# Patient Record
Sex: MALE | URBAN - NONMETROPOLITAN AREA
[De-identification: names, ages, dates, MRNs, and addresses within clinical notes are randomized per-mention and may not be internally consistent; named-entity substitution may affect disease eponyms.]

---

## 2023-03-27 ENCOUNTER — APPOINTMENT (OUTPATIENT)
Dept: URBAN - NONMETROPOLITAN AREA CLINIC 47 | Age: 43
Setting detail: DERMATOLOGY
End: 2023-03-28

## 2023-03-27 DIAGNOSIS — L30.1 DYSHIDROSIS [POMPHOLYX]: ICD-10-CM

## 2023-03-27 PROCEDURE — 99204 OFFICE O/P NEW MOD 45 MIN: CPT

## 2023-03-27 PROCEDURE — OTHER COUNSELING: OTHER

## 2023-03-27 PROCEDURE — OTHER ADDITIONAL NOTES: OTHER

## 2023-03-27 PROCEDURE — OTHER PRESCRIPTION: OTHER

## 2023-03-27 RX ORDER — HALOBETASOL PROPIONATE 0.5 MG/G
OINTMENT TOPICAL
Qty: 50 | Refills: 3 | Status: ERX | COMMUNITY
Start: 2023-03-27

## 2023-03-27 ASSESSMENT — BSA RASH: BSA RASH: 2

## 2023-03-27 ASSESSMENT — SEVERITY ASSESSMENT 2020: SEVERITY 2020: MODERATE

## 2023-03-27 NOTE — PROCEDURE: ADDITIONAL NOTES
Detail Level: Simple
Additional Notes: Recommended switching to dove sensitive products. If no improvement at f/u appointment will consider patch testing.
Render Risk Assessment In Note?: no

## 2023-04-27 ENCOUNTER — APPOINTMENT (OUTPATIENT)
Dept: URBAN - NONMETROPOLITAN AREA CLINIC 47 | Age: 43
Setting detail: DERMATOLOGY
End: 2023-05-01

## 2023-04-27 DIAGNOSIS — L30.1 DYSHIDROSIS [POMPHOLYX]: ICD-10-CM

## 2023-04-27 PROCEDURE — 99214 OFFICE O/P EST MOD 30 MIN: CPT

## 2023-04-27 PROCEDURE — OTHER COUNSELING: OTHER

## 2023-04-27 PROCEDURE — OTHER ADDITIONAL NOTES: OTHER

## 2023-04-27 PROCEDURE — OTHER PRESCRIPTION MEDICATION MANAGEMENT: OTHER

## 2023-04-27 NOTE — PROCEDURE: ADDITIONAL NOTES
Additional Notes: Recommended patch testing with Dr. Mcallister.
Render Risk Assessment In Note?: no
Detail Level: Simple

## 2023-04-27 NOTE — PROCEDURE: PRESCRIPTION MEDICATION MANAGEMENT
Continue Regimen: Halobetasol 0.05% ointment bid prn.
Render In Strict Bullet Format?: No
Detail Level: Zone

## 2023-05-19 ENCOUNTER — APPOINTMENT (OUTPATIENT)
Dept: URBAN - NONMETROPOLITAN AREA CLINIC 47 | Age: 43
Setting detail: DERMATOLOGY
End: 2023-05-21

## 2023-05-19 ENCOUNTER — RX ONLY (RX ONLY)
Age: 43
End: 2023-05-19

## 2023-05-19 DIAGNOSIS — L30.1 DYSHIDROSIS [POMPHOLYX]: ICD-10-CM

## 2023-05-19 PROCEDURE — OTHER PRESCRIPTION MEDICATION MANAGEMENT: OTHER

## 2023-05-19 PROCEDURE — OTHER DIAGNOSIS COMMENT: OTHER

## 2023-05-19 PROCEDURE — 99214 OFFICE O/P EST MOD 30 MIN: CPT

## 2023-05-19 PROCEDURE — OTHER COUNSELING: OTHER

## 2023-05-19 PROCEDURE — OTHER MIPS QUALITY: OTHER

## 2023-05-19 PROCEDURE — OTHER ORDER FOR PATCH TESTING: OTHER

## 2023-05-19 RX ORDER — HALOBETASOL PROPIONATE 0.5 MG/G
THIN LAYER OINTMENT TOPICAL TWICE DAILY
Qty: 50 | Refills: 2 | Status: ERX

## 2023-05-19 ASSESSMENT — BSA RASH: BSA RASH: 2

## 2023-05-19 ASSESSMENT — SEVERITY ASSESSMENT 2020: SEVERITY 2020: MODERATE

## 2023-05-19 ASSESSMENT — ITCH NUMERIC RATING SCALE: HOW SEVERE IS YOUR ITCHING?: 6

## 2023-05-19 NOTE — PROCEDURE: DIAGNOSIS COMMENT
Detail Level: Simple
Comment: This type of eczema is sometimes flared by allergic contact dermatitis so need to rule it out
Render Risk Assessment In Note?: no

## 2023-05-19 NOTE — PROCEDURE: ORDER FOR PATCH TESTING
Patch Test To Be Applied: Core ACDS Recommended Series
Location Patches Should Be Applied: Back
Detail Level: Simple
Counseling: I discussed the timing of the procedure and ensured the patient understands that this test requires multiple visits. No topical steroids applied should be applied to the patch testing location and no oral prednisone for two week prior to the test. While the patches are in place they should be kept dry which will limit bathing, swimming an exercise. I also explained that it is common for testing to be negative and this doesn't mean there isn't a allergic reaction occurring. During the testing itching is common.
Patch Test Reading Schedule: First Reading at 48 hours and Second Reading at 96 hours

## 2023-05-19 NOTE — PROCEDURE: PRESCRIPTION MEDICATION MANAGEMENT
Continue Regimen: Halobetasol 0.05% ointment bid prn. Prepped new refill on computer
Render In Strict Bullet Format?: No
Detail Level: Zone

## 2023-07-11 ENCOUNTER — APPOINTMENT (OUTPATIENT)
Dept: URBAN - METROPOLITAN AREA CLINIC 216 | Age: 43
Setting detail: DERMATOLOGY
End: 2023-07-11

## 2023-07-11 DIAGNOSIS — L259 CONTACT DERMATITIS AND OTHER ECZEMA, UNSPECIFIED CAUSE: ICD-10-CM

## 2023-07-11 PROBLEM — L23.9 ALLERGIC CONTACT DERMATITIS, UNSPECIFIED CAUSE: Status: ACTIVE | Noted: 2023-07-11

## 2023-07-11 PROCEDURE — OTHER DIAGNOSIS COMMENT: OTHER

## 2023-07-11 PROCEDURE — 95044 PATCH/APPLICATION TESTS: CPT

## 2023-07-11 PROCEDURE — OTHER PATCH TESTING: OTHER

## 2023-07-11 NOTE — PROCEDURE: PATCH TESTING
Number Of Patches (Maximum Allowable Per Dos By Cms Is 90): 36
Consent: Written consent obtained, risks reviewed including but not limited to rash, itching, allergic reaction, post-inflammatory pigmentary changes, systemic rash, remote possibility of anaphylaxis to allergen.
Post-Care Instructions: I reviewed with the patient in detail post-care instructions. Patient should not sweat, pick at, or get the patches wet for 48 hours.
Detail Level: None

## 2023-07-11 NOTE — PROCEDURE: DIAGNOSIS COMMENT
Detail Level: Simple
Comment: Possible ACD as trigger for underlying hand eczema
Render Risk Assessment In Note?: no

## 2023-07-13 ENCOUNTER — APPOINTMENT (OUTPATIENT)
Dept: URBAN - METROPOLITAN AREA CLINIC 216 | Age: 43
Setting detail: DERMATOLOGY
End: 2023-07-13

## 2023-07-13 DIAGNOSIS — L259 CONTACT DERMATITIS AND OTHER ECZEMA, UNSPECIFIED CAUSE: ICD-10-CM

## 2023-07-13 PROBLEM — L23.9 ALLERGIC CONTACT DERMATITIS, UNSPECIFIED CAUSE: Status: ACTIVE | Noted: 2023-07-13

## 2023-07-13 PROCEDURE — 99024 POSTOP FOLLOW-UP VISIT: CPT

## 2023-07-13 PROCEDURE — OTHER PATCH TEST REMOVAL: OTHER

## 2023-07-13 PROCEDURE — OTHER CORE ACDS PATCH TEST READING: OTHER

## 2023-07-13 NOTE — PROCEDURE: CORE ACDS PATCH TEST READING
Allergen 124 Reaction: no reaction
Name Of Allergen 42: 3-(dimethylamino) propylamine (DMAPA) 1% aq
Name Of Allergen 3: Neomycin 20% pet
Name Of Allergen 55: 2-usqzanj1-xjcsxpdhqprnwbbcxem (Benzophenone 3) 10% pet
Show Negative Results In The Note?: Yes
Name Of Allergen 33: Bacitracin 20% pet
Name Of Allergen 59: Hydroxyperoxides of limonene 2% pet
Name Of Allergen 74: Ethyl cyanoacrylate 10% pet
Name Of Allergen 54: 4-chloro-3,5-xylenon (PCMX) 1% pet
Name Of Allergen 79: 2-ethylhexyl-4-methoxycinnamate 10% pet (octinoxate)
Name Of Allergen 68: 1,3-diphenylguanidine (DPG)
Name Of Allergen 62: Sodium benzoate 5% pet
Name Of Allergen 9: Methylisothiazolinone 0.2% aq
Name Of Allergen 14: Bisphenol A epoxy resin 1% pet
Name Of Allergen 16: Black rubber mix 0.6% pet
Name Of Allergen 25: Diazolidinyl urea 1% pet
Name Of Allergen 40: Cocamidopropyl betaine 1% aq
Name Of Allergen 60: Benzalkonium chloride 0.1% pet
Name Of Allergen 66: Ethylemeurea melamine -formaldehyde 5% pet
Name Of Allergen 19: Methyldibromoglutaronitrile 0.5%
Name Of Allergen 36: Lidocaine 15% pet
Name Of Allergen 89: Lauryl glycoside 3% pet
Name Of Allergen 61: 2-hydroxy-4-methoxybenzophenone-5-sulfonic acid (benzophenone-4) 10% pet
Name Of Allergen 86: Mentha piperita oil 2% (peppermint oil)
Name Of Allergen 58: Cocamide ANNA 0.5%
Name Of Allergen 71: Triamcinolone 1% pet
Name Of Allergen 82: [place] 2.5% pet
Name Of Allergen 67: Sorbitan sesquioleate 20% pet
Name Of Allergen 12: Cobalt chloride 1% pet
Name Of Allergen 73: Amidoamine 0.1% aq
Name Of Allergen 84: Disperse yellow 3% pet
Name Of Allergen 70: Ethyl hexyl glycerol 5%
Name Of Allergen 83: Benzyl salicylate 1% pet
Name Of Allergen 35: Disperse Blue 106/124 mix 1% pet
Name Of Allergen 8: Paraben mix 12% pet
Name Of Allergen 75: Phenoxyethanol 1% pet
Name Of Allergen 56: Tosylamide formaldehyde resin 10% pet
Detail Level: Zone
Name Of Allergen 38: Isopropynyl butylcarbamate 0.1% pet
Name Of Allergen 87: Parmoxine hydrochloride 2% pet
Name Of Allergen 4: Potassium dichromate 0.25% pet
Name Of Allergen 64: Jillian morrison 2% pet (Cananga odorata)
What Reading Time Point?: 48 hour
Name Of Allergen 13: p-shhs-fnszksfzwxm formaldehyde resin 1% pet
Name Of Allergen 2: LAnolin alcohol (Amerchol 101) 50% pet
Name Of Allergen 49: D/L-alpha-tocopherol 100%
Name Of Allergen 31: Hydrocortisone-17-butyrate 1% pet
Name Of Allergen 39: Polymyxin B sulfate 3% pet
Name Of Allergen 15: Carba mix 3% pet
Name Of Allergen 72: Clobetasol-17-proprionate 1%
Number Of Patches Read: 36
Name Of Allergen 30: Budesonide 0.1% pet
Name Of Allergen 53: Propolis 10% pet
Name Of Allergen 22: Mercapto mix 1% pet
Name Of Allergen 7: Colophony 20% pet
Name Of Allergen 45: Decyl glucoside 5% pet
Name Of Allergen 41: Mixed dialkyl thioureas 1% pet
Show Allergen Counseling In The Note?: No
Name Of Allergen 21: Formaldehhyde 1% aq
Name Of Allergen 28: Gold sodium thiosulfate 2% pet
Name Of Allergen 65: Compositae mix 6% pet
Name Of Allergen 37: Popylene glycol 30%
Name Of Allergen 10: Balsam of [place] (Myroxylon pereirae) 25% pet
Name Of Allergen 32: 2-Mercaptobenzothiazole 1% pet
Name Of Allergen 50: Ethyl acrylate 0.1% pet
Name Of Allergen 52: Chlorhexidine digluconate 0.5% aq
Name Of Allergen 23: 2-Bromo-2-nitropropane 1,3-diol 0.5% pet
Name Of Allergen 34: Fragrance mix II 14% pet
Name Of Allergen 63: Sorbic acid 2% pet
Name Of Allergen 11: Ethylenediamine dihydrochloride 1%
Name Of Allergen 80: Benzyl alcohol 10% pet
Name Of Allergen 76: Disperse orange 3  1% pet
Name Of Allergen 85: Shreya absolute 2% pet
Name Of Allergen 48: Cinnamic aldehyde 1% pet
Name Of Allergen 69: 4-chloro-3-cresol 1% pet
Name Of Allergen 77: Benzoic acid 5% pet
Name Of Allergen 46: Methyl methacyrlate 2% pet
Name Of Allergen 24: Thiuram mix 3% pet
Name Of Allergen 81: Cetyl Steryl alcohol 20% pet
Name Of Allergen 5: DMDM Hydantoin 1.0% pet
Name Of Allergen 1: Nickel sulfate 2.5% pet
Name Of Allergen 44: Oleamidopropyl dimethylamine 0.1% aq
Name Of Allergen 88: Shellac 20% ethanol
Name Of Allergen 51: Tea tree oil 5% pet
Name Of Allergen 27: Tixocortol-21-pivalate 1% pet
Name Of Allergen 20: p-phenylenediamine 1% pet
Name Of Allergen 29: Imidazolidinyl urea 2% pet
Name Of Allergen 17: Methylchloroisothiazolinone/methylisothiazolinone 100ppm aq
Name Of Allergen 57: Sesquiterpene lactone mix 0.1% pet
Name Of Allergen 78: Butylhydroxytolulene (BHT) 2% pet
Name Of Allergen 6: Fragrance mix I 8.0% pet
Name Of Allergen 47: Lavender absolute 2% pet
Name Of Allergen 43: 2-hydroxyethyl methacrylate 2% pet
Name Of Allergen 18: Quaternium-15 2% pet
Name Of Allergen 26: Benzocaine 5% pet

## 2023-07-15 ENCOUNTER — APPOINTMENT (OUTPATIENT)
Dept: URBAN - METROPOLITAN AREA CLINIC 216 | Age: 43
Setting detail: DERMATOLOGY
End: 2023-07-15

## 2023-07-15 DIAGNOSIS — L30.1 DYSHIDROSIS [POMPHOLYX]: ICD-10-CM

## 2023-07-15 PROBLEM — Z01.82 ENCOUNTER FOR ALLERGY TESTING: Status: ACTIVE | Noted: 2023-07-15

## 2023-07-15 PROCEDURE — OTHER MEDICATION COUNSELING: OTHER

## 2023-07-15 PROCEDURE — OTHER CORE ACDS PATCH TEST READING: OTHER

## 2023-07-15 PROCEDURE — 99213 OFFICE O/P EST LOW 20 MIN: CPT

## 2023-07-15 PROCEDURE — OTHER COUNSELING: OTHER

## 2023-07-15 PROCEDURE — OTHER PRESCRIPTION: OTHER

## 2023-07-15 RX ORDER — CLOBETASOL PROPIONATE 0.5 MG/G
OINTMENT TOPICAL TWICE DAILY
Qty: 45 | Refills: 5 | Status: ERX | COMMUNITY
Start: 2023-07-15

## 2023-07-15 RX ORDER — TACROLIMUS 1 MG/G
1 OINTMENT TOPICAL QHS
Qty: 60 | Refills: 2 | Status: ERX | COMMUNITY
Start: 2023-07-15

## 2023-07-15 ASSESSMENT — LOCATION SIMPLE DESCRIPTION DERM
LOCATION SIMPLE: LEFT HAND
LOCATION SIMPLE: RIGHT HAND

## 2023-07-15 ASSESSMENT — LOCATION DETAILED DESCRIPTION DERM
LOCATION DETAILED: LEFT RADIAL DORSAL HAND
LOCATION DETAILED: RIGHT DORSAL MIDDLE METACARPOPHALANGEAL JOINT

## 2023-07-15 ASSESSMENT — LOCATION ZONE DERM: LOCATION ZONE: HAND

## 2023-07-15 NOTE — PROCEDURE: CORE ACDS PATCH TEST READING
Name Of Allergen 87: Parmoxine hydrochloride 2% pet
Allergen 146 Reaction: no reaction
Name Of Allergen 9: Methylisothiazolinone 0.2% aq
Name Of Allergen 60: Benzalkonium chloride 0.1% pet
Name Of Allergen 73: Amidoamine 0.1% aq
Name Of Allergen 78: Butylhydroxytolulene (BHT) 2% pet
Name Of Allergen 3: Neomycin 20% pet
Name Of Allergen 28: Gold sodium thiosulfate 2% pet
Name Of Allergen 66: Ethylemeurea melamine -formaldehyde 5% pet
Name Of Allergen 75: Phenoxyethanol 1% pet
Name Of Allergen 49: D/L-alpha-tocopherol 100%
Name Of Allergen 85: Shreya absolute 2% pet
Name Of Allergen 26: Benzocaine 5% pet
Name Of Allergen 88: Shellac 20% ethanol
Name Of Allergen 19: Methyldibromoglutaronitrile 0.5%
Name Of Allergen 29: Imidazolidinyl urea 2% pet
Name Of Allergen 70: Ethyl hexyl glycerol 5%
Name Of Allergen 11: Ethylenediamine dihydrochloride 1%
Name Of Allergen 58: Cocamide ANNA 0.5%
What Reading Time Point?: 96 hour
Show Negative Results In The Note?: Yes
Name Of Allergen 64: Jillian morrison 2% pet (Cananga odorata)
Name Of Allergen 40: Cocamidopropyl betaine 1% aq
Name Of Allergen 77: Benzoic acid 5% pet
Name Of Allergen 82: [place] 2.5% pet
Name Of Allergen 22: Mercapto mix 1% pet
Name Of Allergen 67: Sorbitan sesquioleate 20% pet
Name Of Allergen 53: Propolis 10% pet
Name Of Allergen 16: Black rubber mix 0.6% pet
Name Of Allergen 43: 2-hydroxyethyl methacrylate 2% pet
Name Of Allergen 50: Ethyl acrylate 0.1% pet
Name Of Allergen 18: Quaternium-15 2% pet
Name Of Allergen 2: LAnolin alcohol (Amerchol 101) 50% pet
Name Of Allergen 83: Benzyl salicylate 1% pet
Name Of Allergen 21: Formaldehhyde 1% aq
Name Of Allergen 68: 1,3-diphenylguanidine (DPG)
Detail Level: Zone
Name Of Allergen 45: Decyl glucoside 5% pet
Name Of Allergen 69: 4-chloro-3-cresol 1% pet
Name Of Allergen 42: 3-(dimethylamino) propylamine (DMAPA) 1% aq
Name Of Allergen 81: Cetyl Steryl alcohol 20% pet
Name Of Allergen 86: Mentha piperita oil 2% (peppermint oil)
Name Of Allergen 32: 2-Mercaptobenzothiazole 1% pet
Name Of Allergen 41: Mixed dialkyl thioureas 1% pet
Name Of Allergen 37: Popylene glycol 30%
Name Of Allergen 71: Triamcinolone 1% pet
Name Of Allergen 80: Benzyl alcohol 10% pet
Name Of Allergen 65: Compositae mix 6% pet
Name Of Allergen 17: Methylchloroisothiazolinone/methylisothiazolinone 100ppm aq
Show Allergen Counseling In The Note?: No
Name Of Allergen 57: Sesquiterpene lactone mix 0.1% pet
Name Of Allergen 15: Carba mix 3% pet
Name Of Allergen 31: Hydrocortisone-17-butyrate 1% pet
Name Of Allergen 35: Disperse Blue 106/124 mix 1% pet
Name Of Allergen 59: Hydroxyperoxides of limonene 2% pet
Name Of Allergen 14: Bisphenol A epoxy resin 1% pet
Name Of Allergen 52: Chlorhexidine digluconate 0.5% aq
Name Of Allergen 62: Sodium benzoate 5% pet
Name Of Allergen 55: 8-qzrsvhp8-gbgtvptmtbkjhkunegi (Benzophenone 3) 10% pet
Name Of Allergen 1: Nickel sulfate 2.5% pet
Name Of Allergen 46: Methyl methacyrlate 2% pet
Name Of Allergen 34: Fragrance mix II 14% pet
Name Of Allergen 38: Isopropynyl butylcarbamate 0.1% pet
Name Of Allergen 6: Fragrance mix I 8.0% pet
Name Of Allergen 27: Tixocortol-21-pivalate 1% pet
Name Of Allergen 79: 2-ethylhexyl-4-methoxycinnamate 10% pet (octinoxate)
Name Of Allergen 30: Budesonide 0.1% pet
Name Of Allergen 72: Clobetasol-17-proprionate 1%
Name Of Allergen 84: Disperse yellow 3% pet
Name Of Allergen 36: Lidocaine 15% pet
Name Of Allergen 74: Ethyl cyanoacrylate 10% pet
Name Of Allergen 25: Diazolidinyl urea 1% pet
Name Of Allergen 54: 4-chloro-3,5-xylenon (PCMX) 1% pet
Number Of Patches Read: 36
Name Of Allergen 4: Potassium dichromate 0.25% pet
Name Of Allergen 24: Thiuram mix 3% pet
Name Of Allergen 20: p-phenylenediamine 1% pet
Name Of Allergen 13: n-wwhd-oddenwfwyyf formaldehyde resin 1% pet
Name Of Allergen 33: Bacitracin 20% pet
Name Of Allergen 10: Balsam of [place] (Myroxylon pereirae) 25% pet
Name Of Allergen 48: Cinnamic aldehyde 1% pet
Name Of Allergen 76: Disperse orange 3  1% pet
Name Of Allergen 44: Oleamidopropyl dimethylamine 0.1% aq
Name Of Allergen 7: Colophony 20% pet
Name Of Allergen 63: Sorbic acid 2% pet
Name Of Allergen 23: 2-Bromo-2-nitropropane 1,3-diol 0.5% pet
Name Of Allergen 56: Tosylamide formaldehyde resin 10% pet
Name Of Allergen 5: DMDM Hydantoin 1.0% pet
Name Of Allergen 39: Polymyxin B sulfate 3% pet
Name Of Allergen 12: Cobalt chloride 1% pet
Name Of Allergen 8: Paraben mix 12% pet
Name Of Allergen 51: Tea tree oil 5% pet
Name Of Allergen 47: Lavender absolute 2% pet
Name Of Allergen 89: Lauryl glycoside 3% pet
Name Of Allergen 61: 2-hydroxy-4-methoxybenzophenone-5-sulfonic acid (benzophenone-4) 10% pet

## 2023-07-15 NOTE — PROCEDURE: COUNSELING
Detail Level: Detailed
Patient Specific Counseling (Will Not Stick From Patient To Patient): Explained that this is a form of atopic dermatitis, so will try tacrolimus ointment for maintenance (along with clobetasol), and if not improved over a few months, can consider systemic therapy with Dupixent, Adbry, or a ARMANI inhibitor.

## 2023-08-28 ENCOUNTER — APPOINTMENT (OUTPATIENT)
Dept: URBAN - METROPOLITAN AREA CLINIC 216 | Age: 43
Setting detail: DERMATOLOGY
End: 2023-08-29

## 2023-08-28 DIAGNOSIS — L30.1 DYSHIDROSIS [POMPHOLYX]: ICD-10-CM

## 2023-08-28 PROCEDURE — OTHER COUNSELING: OTHER

## 2023-08-28 PROCEDURE — 99213 OFFICE O/P EST LOW 20 MIN: CPT

## 2023-08-28 PROCEDURE — OTHER PRESCRIPTION MEDICATION MANAGEMENT: OTHER

## 2023-08-28 PROCEDURE — OTHER PRESCRIPTION: OTHER

## 2023-08-28 RX ORDER — CLOBETASOL PROPIONATE 0.5 MG/G
OINTMENT TOPICAL TWICE DAILY
Qty: 45 | Refills: 5 | Status: ERX

## 2023-08-28 ASSESSMENT — LOCATION DETAILED DESCRIPTION DERM
LOCATION DETAILED: RIGHT RADIAL DORSAL HAND
LOCATION DETAILED: LEFT ULNAR DORSAL HAND
LOCATION DETAILED: LEFT THENAR EMINENCE
LOCATION DETAILED: RIGHT THENAR EMINENCE

## 2023-08-28 ASSESSMENT — LOCATION ZONE DERM: LOCATION ZONE: HAND

## 2023-08-28 ASSESSMENT — LOCATION SIMPLE DESCRIPTION DERM
LOCATION SIMPLE: LEFT HAND
LOCATION SIMPLE: RIGHT HAND

## 2023-08-28 NOTE — PROCEDURE: PRESCRIPTION MEDICATION MANAGEMENT
Continue Regimen: Clobetasol 0.05% bid Monday thru Friday apply to hands
Render In Strict Bullet Format?: No
Detail Level: Zone
Plan: OTC Vaseline on hands intermittently

## 2023-11-28 ENCOUNTER — APPOINTMENT (OUTPATIENT)
Dept: URBAN - METROPOLITAN AREA CLINIC 216 | Age: 43
Setting detail: DERMATOLOGY
End: 2023-11-29

## 2023-11-28 DIAGNOSIS — L30.1 DYSHIDROSIS [POMPHOLYX]: ICD-10-CM

## 2023-11-28 PROCEDURE — 99213 OFFICE O/P EST LOW 20 MIN: CPT

## 2023-11-28 PROCEDURE — OTHER PRESCRIPTION: OTHER

## 2023-11-28 PROCEDURE — OTHER COUNSELING: OTHER

## 2023-11-28 RX ORDER — TACROLIMUS 1 MG/G
THIN LAYER OINTMENT TOPICAL QHS
Qty: 60 | Refills: 11 | Status: ERX

## 2023-11-28 RX ORDER — CLOBETASOL PROPIONATE 0.5 MG/G
THIN LAYER OINTMENT TOPICAL TWICE DAILY
Qty: 45 | Refills: 11 | Status: ERX

## 2023-11-28 ASSESSMENT — LOCATION SIMPLE DESCRIPTION DERM
LOCATION SIMPLE: RIGHT HAND
LOCATION SIMPLE: LEFT HAND

## 2023-11-28 ASSESSMENT — SEVERITY ASSESSMENT 2020: SEVERITY 2020: MODERATE

## 2023-11-28 ASSESSMENT — ITCH NUMERIC RATING SCALE: HOW SEVERE IS YOUR ITCHING?: 10

## 2023-11-28 ASSESSMENT — LOCATION DETAILED DESCRIPTION DERM
LOCATION DETAILED: LEFT THENAR EMINENCE
LOCATION DETAILED: LEFT ULNAR DORSAL HAND
LOCATION DETAILED: RIGHT RADIAL DORSAL HAND
LOCATION DETAILED: RIGHT THENAR EMINENCE

## 2023-11-28 ASSESSMENT — LOCATION ZONE DERM: LOCATION ZONE: HAND

## 2023-11-28 ASSESSMENT — BSA RASH: BSA RASH: 2

## 2024-12-03 ENCOUNTER — APPOINTMENT (OUTPATIENT)
Dept: URBAN - METROPOLITAN AREA CLINIC 216 | Age: 44
Setting detail: DERMATOLOGY
End: 2024-12-03

## 2024-12-03 DIAGNOSIS — L30.1 DYSHIDROSIS [POMPHOLYX]: ICD-10-CM

## 2024-12-03 PROCEDURE — 99213 OFFICE O/P EST LOW 20 MIN: CPT

## 2024-12-03 PROCEDURE — OTHER PRESCRIPTION: OTHER

## 2024-12-03 PROCEDURE — OTHER COUNSELING: OTHER

## 2024-12-03 RX ORDER — TACROLIMUS 1 MG/G
THIN LAYER OINTMENT TOPICAL QHS
Qty: 60 | Refills: 11 | Status: ERX

## 2024-12-03 RX ORDER — CLOBETASOL PROPIONATE 0.5 MG/G
THIN LAYER OINTMENT TOPICAL TWICE DAILY
Qty: 60 | Refills: 11 | Status: ERX

## 2024-12-03 ASSESSMENT — LOCATION SIMPLE DESCRIPTION DERM
LOCATION SIMPLE: LEFT HAND
LOCATION SIMPLE: RIGHT HAND

## 2024-12-03 ASSESSMENT — LOCATION DETAILED DESCRIPTION DERM
LOCATION DETAILED: RIGHT THENAR EMINENCE
LOCATION DETAILED: RIGHT RADIAL DORSAL HAND
LOCATION DETAILED: LEFT ULNAR DORSAL HAND
LOCATION DETAILED: LEFT THENAR EMINENCE

## 2024-12-03 ASSESSMENT — BSA RASH: BSA RASH: 1

## 2024-12-03 ASSESSMENT — ITCH NUMERIC RATING SCALE: HOW SEVERE IS YOUR ITCHING?: 0

## 2024-12-03 ASSESSMENT — LOCATION ZONE DERM: LOCATION ZONE: HAND
